# Patient Record
Sex: MALE | Race: WHITE | NOT HISPANIC OR LATINO | Employment: OTHER | URBAN - METROPOLITAN AREA
[De-identification: names, ages, dates, MRNs, and addresses within clinical notes are randomized per-mention and may not be internally consistent; named-entity substitution may affect disease eponyms.]

---

## 2017-10-24 ENCOUNTER — RECORDS - HEALTHEAST (OUTPATIENT)
Dept: LAB | Facility: CLINIC | Age: 72
End: 2017-10-24

## 2017-10-24 LAB
CHOLEST SERPL-MCNC: 172 MG/DL
FASTING STATUS PATIENT QL REPORTED: ABNORMAL
HDLC SERPL-MCNC: 30 MG/DL
LDLC SERPL CALC-MCNC: 111 MG/DL
PSA SERPL-MCNC: 4 NG/ML (ref 0–6.5)
TRIGL SERPL-MCNC: 154 MG/DL

## 2018-06-13 ENCOUNTER — RECORDS - HEALTHEAST (OUTPATIENT)
Dept: LAB | Facility: CLINIC | Age: 73
End: 2018-06-13

## 2018-06-14 LAB
CHOLEST SERPL-MCNC: 188 MG/DL
FASTING STATUS PATIENT QL REPORTED: NO
HDLC SERPL-MCNC: 29 MG/DL
LDLC SERPL CALC-MCNC: 105 MG/DL
TRIGL SERPL-MCNC: 270 MG/DL
URATE SERPL-MCNC: 7.4 MG/DL (ref 3–8)

## 2019-04-03 ENCOUNTER — RECORDS - HEALTHEAST (OUTPATIENT)
Dept: LAB | Facility: CLINIC | Age: 74
End: 2019-04-03

## 2019-04-03 LAB
ALBUMIN SERPL-MCNC: 3.8 G/DL (ref 3.5–5)
ALP SERPL-CCNC: 76 U/L (ref 45–120)
ALT SERPL W P-5'-P-CCNC: 37 U/L (ref 0–45)
ANION GAP SERPL CALCULATED.3IONS-SCNC: 12 MMOL/L (ref 5–18)
AST SERPL W P-5'-P-CCNC: 26 U/L (ref 0–40)
BASOPHILS # BLD AUTO: 0 THOU/UL (ref 0–0.2)
BASOPHILS NFR BLD AUTO: 1 % (ref 0–2)
BILIRUB SERPL-MCNC: 0.6 MG/DL (ref 0–1)
BUN SERPL-MCNC: 18 MG/DL (ref 8–28)
CALCIUM SERPL-MCNC: 9.8 MG/DL (ref 8.5–10.5)
CHLORIDE BLD-SCNC: 108 MMOL/L (ref 98–107)
CHOLEST SERPL-MCNC: 187 MG/DL
CO2 SERPL-SCNC: 23 MMOL/L (ref 22–31)
CREAT SERPL-MCNC: 1.19 MG/DL (ref 0.7–1.3)
EOSINOPHIL # BLD AUTO: 0.2 THOU/UL (ref 0–0.4)
EOSINOPHIL NFR BLD AUTO: 3 % (ref 0–6)
ERYTHROCYTE [DISTWIDTH] IN BLOOD BY AUTOMATED COUNT: 14.4 % (ref 11–14.5)
FASTING STATUS PATIENT QL REPORTED: ABNORMAL
GFR SERPL CREATININE-BSD FRML MDRD: 60 ML/MIN/1.73M2
GLUCOSE BLD-MCNC: 109 MG/DL (ref 70–125)
HCT VFR BLD AUTO: 43.3 % (ref 40–54)
HDLC SERPL-MCNC: 31 MG/DL
HGB BLD-MCNC: 13.8 G/DL (ref 14–18)
LDLC SERPL CALC-MCNC: 100 MG/DL
LYMPHOCYTES # BLD AUTO: 1.6 THOU/UL (ref 0.8–4.4)
LYMPHOCYTES NFR BLD AUTO: 22 % (ref 20–40)
MCH RBC QN AUTO: 29.6 PG (ref 27–34)
MCHC RBC AUTO-ENTMCNC: 31.9 G/DL (ref 32–36)
MCV RBC AUTO: 93 FL (ref 80–100)
MONOCYTES # BLD AUTO: 0.8 THOU/UL (ref 0–0.9)
MONOCYTES NFR BLD AUTO: 11 % (ref 2–10)
NEUTROPHILS # BLD AUTO: 4.5 THOU/UL (ref 2–7.7)
NEUTROPHILS NFR BLD AUTO: 63 % (ref 50–70)
PLATELET # BLD AUTO: 233 THOU/UL (ref 140–440)
PMV BLD AUTO: 10.9 FL (ref 8.5–12.5)
POTASSIUM BLD-SCNC: 3.9 MMOL/L (ref 3.5–5)
PROT SERPL-MCNC: 6.3 G/DL (ref 6–8)
RBC # BLD AUTO: 4.66 MILL/UL (ref 4.4–6.2)
SODIUM SERPL-SCNC: 143 MMOL/L (ref 136–145)
TRIGL SERPL-MCNC: 282 MG/DL
URATE SERPL-MCNC: 5.9 MG/DL (ref 3–8)
WBC: 7.2 THOU/UL (ref 4–11)

## 2019-12-02 ENCOUNTER — AMBULATORY - HEALTHEAST (OUTPATIENT)
Dept: OTHER | Facility: CLINIC | Age: 74
End: 2019-12-02

## 2020-06-30 ENCOUNTER — RECORDS - HEALTHEAST (OUTPATIENT)
Dept: LAB | Facility: CLINIC | Age: 75
End: 2020-06-30

## 2020-06-30 LAB
ALBUMIN SERPL-MCNC: 3.8 G/DL (ref 3.5–5)
ALP SERPL-CCNC: 86 U/L (ref 45–120)
ALT SERPL W P-5'-P-CCNC: 21 U/L (ref 0–45)
ANION GAP SERPL CALCULATED.3IONS-SCNC: 10 MMOL/L (ref 5–18)
AST SERPL W P-5'-P-CCNC: 17 U/L (ref 0–40)
BASOPHILS # BLD AUTO: 0 THOU/UL (ref 0–0.2)
BASOPHILS NFR BLD AUTO: 0 % (ref 0–2)
BILIRUB SERPL-MCNC: 0.5 MG/DL (ref 0–1)
BUN SERPL-MCNC: 21 MG/DL (ref 8–28)
CALCIUM SERPL-MCNC: 9.1 MG/DL (ref 8.5–10.5)
CHLORIDE BLD-SCNC: 109 MMOL/L (ref 98–107)
CHOLEST SERPL-MCNC: 175 MG/DL
CO2 SERPL-SCNC: 22 MMOL/L (ref 22–31)
CREAT SERPL-MCNC: 1.13 MG/DL (ref 0.7–1.3)
EOSINOPHIL # BLD AUTO: 0.3 THOU/UL (ref 0–0.4)
EOSINOPHIL NFR BLD AUTO: 6 % (ref 0–6)
ERYTHROCYTE [DISTWIDTH] IN BLOOD BY AUTOMATED COUNT: 14 % (ref 11–14.5)
FASTING STATUS PATIENT QL REPORTED: ABNORMAL
GFR SERPL CREATININE-BSD FRML MDRD: >60 ML/MIN/1.73M2
GLUCOSE BLD-MCNC: 87 MG/DL (ref 70–125)
HCT VFR BLD AUTO: 38.1 % (ref 40–54)
HDLC SERPL-MCNC: 32 MG/DL
HGB BLD-MCNC: 11.5 G/DL (ref 14–18)
LDLC SERPL CALC-MCNC: 95 MG/DL
LYMPHOCYTES # BLD AUTO: 1.5 THOU/UL (ref 0.8–4.4)
LYMPHOCYTES NFR BLD AUTO: 24 % (ref 20–40)
MCH RBC QN AUTO: 26.2 PG (ref 27–34)
MCHC RBC AUTO-ENTMCNC: 30.2 G/DL (ref 32–36)
MCV RBC AUTO: 87 FL (ref 80–100)
MONOCYTES # BLD AUTO: 0.6 THOU/UL (ref 0–0.9)
MONOCYTES NFR BLD AUTO: 10 % (ref 2–10)
NEUTROPHILS # BLD AUTO: 3.5 THOU/UL (ref 2–7.7)
NEUTROPHILS NFR BLD AUTO: 59 % (ref 50–70)
PLATELET # BLD AUTO: 264 THOU/UL (ref 140–440)
PMV BLD AUTO: 10.8 FL (ref 8.5–12.5)
POTASSIUM BLD-SCNC: 4.1 MMOL/L (ref 3.5–5)
PROT SERPL-MCNC: 6.2 G/DL (ref 6–8)
RBC # BLD AUTO: 4.39 MILL/UL (ref 4.4–6.2)
SODIUM SERPL-SCNC: 141 MMOL/L (ref 136–145)
TRIGL SERPL-MCNC: 242 MG/DL
URATE SERPL-MCNC: 5.9 MG/DL (ref 3–8)
WBC: 6 THOU/UL (ref 4–11)

## 2021-02-26 ENCOUNTER — AMBULATORY - HEALTHEAST (OUTPATIENT)
Dept: NURSING | Facility: CLINIC | Age: 76
End: 2021-02-26

## 2021-03-19 ENCOUNTER — AMBULATORY - HEALTHEAST (OUTPATIENT)
Dept: NURSING | Facility: CLINIC | Age: 76
End: 2021-03-19

## 2021-04-04 ENCOUNTER — COMMUNICATION - HEALTHEAST (OUTPATIENT)
Dept: SCHEDULING | Facility: CLINIC | Age: 76
End: 2021-04-04

## 2021-04-05 ENCOUNTER — COMMUNICATION - HEALTHEAST (OUTPATIENT)
Dept: SCHEDULING | Facility: CLINIC | Age: 76
End: 2021-04-05

## 2021-04-05 ENCOUNTER — AMBULATORY - HEALTHEAST (OUTPATIENT)
Dept: CARDIOLOGY | Facility: CLINIC | Age: 76
End: 2021-04-05

## 2021-04-05 DIAGNOSIS — E78.5 HYPERLIPIDEMIA: ICD-10-CM

## 2021-04-05 DIAGNOSIS — R06.09 EXERTIONAL DYSPNEA: ICD-10-CM

## 2021-04-08 ENCOUNTER — HOSPITAL ENCOUNTER (OUTPATIENT)
Dept: NUCLEAR MEDICINE | Facility: HOSPITAL | Age: 76
Discharge: HOME | End: 2021-04-08
Attending: INTERNAL MEDICINE

## 2021-04-08 ENCOUNTER — HOSPITAL ENCOUNTER (OUTPATIENT)
Dept: CARDIOLOGY | Facility: HOSPITAL | Age: 76
Discharge: HOME | End: 2021-04-08
Attending: INTERNAL MEDICINE

## 2021-04-08 DIAGNOSIS — E78.5 HYPERLIPIDEMIA: ICD-10-CM

## 2021-04-08 DIAGNOSIS — R06.09 EXERTIONAL DYSPNEA: ICD-10-CM

## 2021-04-08 LAB
CV STRESS CURRENT BP HE: NORMAL
CV STRESS CURRENT HR HE: 101
CV STRESS CURRENT HR HE: 107
CV STRESS CURRENT HR HE: 109
CV STRESS CURRENT HR HE: 113
CV STRESS CURRENT HR HE: 123
CV STRESS CURRENT HR HE: 126
CV STRESS CURRENT HR HE: 83
CV STRESS CURRENT HR HE: 90
CV STRESS CURRENT HR HE: 91
CV STRESS CURRENT HR HE: 94
CV STRESS CURRENT HR HE: 96
CV STRESS CURRENT HR HE: 99
CV STRESS CURRENT HR HE: 99
CV STRESS DEVIATION TIME HE: NORMAL
CV STRESS ECHO PERCENT HR HE: NORMAL
CV STRESS EXERCISE STAGE HE: NORMAL
CV STRESS FINAL RESTING BP HE: NORMAL
CV STRESS FINAL RESTING HR HE: 94
CV STRESS MAX HR HE: 126
CV STRESS MAX TREADMILL GRADE HE: 10
CV STRESS MAX TREADMILL SPEED HE: 1.7
CV STRESS PEAK DIA BP HE: NORMAL
CV STRESS PEAK SYS BP HE: NORMAL
CV STRESS PHASE HE: NORMAL
CV STRESS PROTOCOL HE: NORMAL
CV STRESS RESTING PT POSITION HE: NORMAL
CV STRESS RESTING PT POSITION HE: NORMAL
CV STRESS ST DEVIATION AMOUNT HE: NORMAL
CV STRESS ST DEVIATION ELEVATION HE: NORMAL
CV STRESS ST EVELATION AMOUNT HE: NORMAL
CV STRESS TEST TYPE HE: NORMAL
CV STRESS TOTAL STAGE TIME MIN 1 HE: NORMAL
NUC STRESS EJECTION FRACTION: 70 %
RATE PRESSURE PRODUCT: NORMAL
STRESS ECHO BASELINE DIASTOLIC HE: 79
STRESS ECHO BASELINE HR: 84
STRESS ECHO BASELINE SYSTOLIC BP: 124
STRESS ECHO CALCULATED PERCENT HR: 88 %
STRESS ECHO LAST STRESS DIASTOLIC BP: 74
STRESS ECHO LAST STRESS HR: 126
STRESS ECHO LAST STRESS SYSTOLIC BP: 132
STRESS ECHO POST ESTIMATED WORKLOAD: 4.7
STRESS ECHO POST EXERCISE DUR MIN: 3
STRESS ECHO POST EXERCISE DUR SEC: 0
STRESS ECHO TARGET HR: 144

## 2021-04-14 ENCOUNTER — RECORDS - HEALTHEAST (OUTPATIENT)
Dept: ADMINISTRATIVE | Facility: OTHER | Age: 76
End: 2021-04-14

## 2021-04-14 ENCOUNTER — AMBULATORY - HEALTHEAST (OUTPATIENT)
Dept: CARDIOLOGY | Facility: CLINIC | Age: 76
End: 2021-04-14

## 2021-04-16 ENCOUNTER — OFFICE VISIT - HEALTHEAST (OUTPATIENT)
Dept: CARDIOLOGY | Facility: CLINIC | Age: 76
End: 2021-04-16

## 2021-04-16 DIAGNOSIS — I10 ESSENTIAL HYPERTENSION: ICD-10-CM

## 2021-04-16 ASSESSMENT — MIFFLIN-ST. JEOR: SCORE: 1784.06

## 2021-05-24 ENCOUNTER — RECORDS - HEALTHEAST (OUTPATIENT)
Dept: ADMINISTRATIVE | Facility: CLINIC | Age: 76
End: 2021-05-24

## 2021-05-25 ENCOUNTER — RECORDS - HEALTHEAST (OUTPATIENT)
Dept: ADMINISTRATIVE | Facility: CLINIC | Age: 76
End: 2021-05-25

## 2021-05-27 ENCOUNTER — RECORDS - HEALTHEAST (OUTPATIENT)
Dept: ADMINISTRATIVE | Facility: CLINIC | Age: 76
End: 2021-05-27

## 2021-06-05 VITALS
HEART RATE: 88 BPM | RESPIRATION RATE: 20 BRPM | WEIGHT: 231 LBS | SYSTOLIC BLOOD PRESSURE: 140 MMHG | DIASTOLIC BLOOD PRESSURE: 74 MMHG | BODY MASS INDEX: 33.07 KG/M2 | HEIGHT: 70 IN

## 2021-06-16 PROBLEM — I87.2 PERIPHERAL VENOUS INSUFFICIENCY: Status: ACTIVE | Noted: 2021-04-03

## 2021-06-16 PROBLEM — R06.09 EXERTIONAL DYSPNEA: Status: ACTIVE | Noted: 2021-04-03

## 2021-06-16 PROBLEM — Z86.711 HISTORY OF PULMONARY EMBOLISM: Status: ACTIVE | Noted: 2021-04-03

## 2021-06-16 PROBLEM — D50.8 IRON DEFICIENCY ANEMIA DUE TO DIETARY CAUSES: Status: ACTIVE | Noted: 2021-04-03

## 2021-06-16 PROBLEM — I50.32 CHRONIC DIASTOLIC HEART FAILURE (H): Status: ACTIVE | Noted: 2021-04-03

## 2021-06-16 PROBLEM — I26.99 PULMONARY EMBOLISM WITHOUT ACUTE COR PULMONALE, UNSPECIFIED CHRONICITY, UNSPECIFIED PULMONARY EMBOLISM TYPE (H): Status: ACTIVE | Noted: 2019-12-02

## 2021-06-16 PROBLEM — E66.9 OBESITY: Status: ACTIVE | Noted: 2021-04-03

## 2021-06-16 PROBLEM — F41.9 ANXIETY: Status: ACTIVE | Noted: 2021-04-03

## 2021-06-19 NOTE — LETTER
Letter by Yoselin Woods RN at      Author: Yoselin Woods RN Service: -- Author Type: --    Filed:  Encounter Date: 12/2/2019 Status: Signed       [facility]  [facility]  [address] [place] [id number]    12/2/2019      Justyn lPata  [address] [facility] [address]    Dear Mr. Plata,    We were recently notified that you requested removal of a person from your emergency contact list. This person is named as a health care agent in your health care directive dated 7/18/11. Because this is a legal document, we cannot remove this person as a contact.    In order to remove someone named as a health care agent you will need to either provide us with a more recently dated health care directive or create a new one.   We have several options to assist you in creating a new document:   Health Care Directives and Advance Care Planning resources can be viewed and printed for free at our website: [email or web address]    Free group classes on Advance Care Planning and completing a Health Care Directive are available at multiple locations and times. These classes are led by trained staff who will provide information and guide you through a Health Care Directive. They can also review, notarize and add your Health Care Directive to your medical record. [place] for a class at [email or web address] or by calling [employer] at   [phone] or toll free [phone].    COPIES of completed Health Care Directives can be brought or mailed to any of our locations, including the address listed below. You can also email a copy to [email or web address]       Sincerely,    [facility] Landrum [facility]  [facility]  [address] [place] [id number]  Email us: [email or web address] Call us: [phone]  Visit at: [email or web address]

## 2021-06-27 ENCOUNTER — HEALTH MAINTENANCE LETTER (OUTPATIENT)
Age: 76
End: 2021-06-27

## 2021-06-30 NOTE — PROGRESS NOTES
Progress Notes by Shantell Calderón MD at 4/16/2021  9:50 AM     Author: Shantell Calderón MD Service: -- Author Type: Physician    Filed: 4/16/2021 10:25 AM Encounter Date: 4/16/2021 Status: Signed    : Shantell Calderón MD (Physician)           Thank you, Dr. Felipe, for asking us to see Justyn Plata at the [facility].      Assessment/Recommendations   Assessment:    1.  Dyslipidemia: Reviewed lipids from June 2020.  LDL is fairly well controlled with low HDL and elevated triglycerides  2.  Hypertension: Mildly elevated today however was better controlled when he recently saw his primary care provider  3.  Obesity  4.  Mild aortic root dilatation   5.  History of DVT/PE  6.  Large hiatal hernia    Plan:  1.  Continue aspirin.  Discussed niacin and TriCor as options regarding better lipid management.  He prefers to continue with lifestyle changes at this time.  Recommend improved exercise, diet and weight loss  2.  Suggested sleep study evaluation for JEFFREY due to daytime fatigue, right ventricular enlargement on echocardiogram.  He declines at this time   follow-up as needed       History of Present Illness    Mr. Justyn Plata is a 75 y.o. male with history of DVT/PE, dyslipidemia, obesity and gout who was admitted to the hospital couple weeks ago with shortness of breath.  Denies any chest pain.  He had a significant episode of shortness of breath that occurred when he was moving equipment.  Troponin and BNP were unremarkable.  Echocardiogram showed mild aortic root dilatation with preserved left ventricular systolic function, mild RV enlargement.  He underwent exercise nuclear stress testing which showed poor functional capacity and otherwise negative for inducible ischemia.    Exercise nuclear stress test 4/8/2021     There is no prior study for comparison.  ?  The patient's exercise capacity is moderately impaired.  No exercise induced angina, but  stopped exercise due to dyspnea.  ?  Exercise nuclear stress test is negative for inducible myocardial ischemia or infarction.  ?  Normal left ventricular size, wall motion and systolic function.  The calculated left ventricular ejection fraction is 70%.  ?  The patient is at a low risk of future cardiac ischemic events.     Comments: The patient only able to do exercise 3 minutes based on Bright protocol.  The patient has exaggerated ventricular rate and blunt blood pressure.     Physical Examination Review of Systems   Vitals:    04/16/21 0947   BP: 140/74   Pulse: 88   Resp: 20     Body mass index is 33.15 kg/m .  Wt Readings from Last 3 Encounters:   04/16/21 (!) 231 lb (104.8 kg)   04/04/21 (!) 226 lb 1.6 oz (102.6 kg)   12/20/20 220 lb (99.8 kg)       General Appearance:   alert, no apparent distress   HEENT:  no scleral icterus; the mucous membranes are pink and moist                                  Neck: No jvd   Chest: the spine is straight and the chest is symmetric   Lungs:   respirations unlabored; the lungs are clear to auscultation   Cardiovascular:   regular rhythm        Extremities: no edema   Skin: no xanthelasma    General: WNL  Eyes: WNL  Ears/Nose/Throat: WNL  Lungs: Shortness of Breath  Heart: WNL  Stomach: WNL  Bladder: WNL  Muscle/Joints: WNL  Skin: WNL  Nervous System: WNL  Mental Health: WNL     Blood: WNL     Medical History  Surgical History Family History Social History   Past Medical History:   Diagnosis Date   ? Anxiety 04/03/2021   ? Bladder wall thickening    ? Cataracts, bilateral    ? Chronic diastolic heart failure (H) 04/03/2021   ? Closed fracture of multiple ribs 10/2020   ? DVT of leg (deep venous thrombosis) (H)    ? Enlarged prostate    ? GERD (gastroesophageal reflux disease)    ? Gout    ? 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 Hyperlipidemia    ? Migraines    ? Nephrolithiasis    ? Onychomycosis    ? Peripheral venous insufficiency 04/03/2021    Past  Surgical History:   Procedure Laterality Date   ? EPIDIDYMECTOMY     ? HERNIA REPAIR      umbilical   ? TOE SURGERY Left     great toe-cyst    Family History   Problem Relation Age of Onset   ? Colon cancer Father    ? Cancer Brother         leiomyosarcoma   ? Diabetes type II Brother     Social History     Socioeconomic History   ? Marital status: Single     Spouse name: Not on file   ? Number of children: Not on file   ? Years of education: Not on file   ? Highest education level: Not on file   Occupational History   ? Not on file   Social Needs   ? Financial resource strain: Not on file   ? Food insecurity     Worry: Not on file     Inability: Not on file   ? Transportation needs     Medical: Not on file     Non-medical: Not on file   Tobacco Use   ? Smoking status: Former Smoker     Packs/day: 2.00     Years: 4.00     Pack years: 8.00     Types: Cigarettes     Quit date: 1964     Years since quittin.3   ? Smokeless tobacco: Never Used   ? Tobacco comment: was up to 3 PPD at end   Substance and Sexual Activity   ? Alcohol use: Yes     Comment: occasional   ? Drug use: No   ? Sexual activity: Not on file   Lifestyle   ? Physical activity     Days per week: Not on file     Minutes per session: Not on file   ? Stress: Not on file   Relationships   ? Social connections     Talks on phone: Not on file     Gets together: Not on file     Attends [employer] service: Not on file     Active member of club or organization: Not on file     Attends meetings of clubs or organizations: Not on file     Relationship status: Not on file   ? Intimate partner violence     Fear of current or ex partner: Not on file     Emotionally abused: Not on file     Physically abused: Not on file     Forced sexual activity: Not on file   Other Topics Concern   ? Not on file   Social History Narrative   ? Not on file          Medications  Allergies   Current Outpatient Medications   Medication Sig Dispense Refill   ? allopurinoL  (ZYLOPRIM) 300 MG tablet Take 300 mg by mouth 3 (three) times a week. Takes MWF in the evening     ? aspirin 81 MG EC tablet Take 81 mg by mouth 3 (three) times a week. Takes MWF in the evening     ? cetirizine (ZYRTEC) 10 MG tablet Take 10 mg by mouth daily as needed for allergies.     ? ibuprofen-diphenhydramine HCl (IBUPROFEN PM) 200-25 mg cap Take 1 tablet by mouth at bedtime as needed.       No current facility-administered medications for this visit.       Allergies   Allergen Reactions   ? Statins-Hmg-Coa Reductase Inhibitors Myalgia         Lab Results    Chemistry/lipid CBC Cardiac Enzymes/BNP/TSH/INR   Lab Results   Component Value Date    CHOL 175 06/30/2020    HDL 32 (L) 06/30/2020    LDLCALC 95 06/30/2020    TRIG 242 (H) 06/30/2020    CREATININE 1.06 04/04/2021    BUN 17 04/04/2021    K 4.3 04/04/2021     04/04/2021     (H) 04/04/2021    CO2 22 04/04/2021    Lab Results   Component Value Date    WBC 7.3 04/03/2021    HGB 9.6 (L) 04/04/2021    HCT 36.1 (L) 04/03/2021    MCV 76 (L) 04/03/2021     04/03/2021    Lab Results   Component Value Date    TROPONINI <0.01 04/04/2021    BNP <10 04/03/2021    TSH 2.88 05/15/2017    INR 0.99 04/03/2021

## 2021-08-27 ENCOUNTER — LAB REQUISITION (OUTPATIENT)
Dept: LAB | Facility: CLINIC | Age: 76
End: 2021-08-27

## 2021-08-27 DIAGNOSIS — D50.8 OTHER IRON DEFICIENCY ANEMIAS: ICD-10-CM

## 2021-08-27 DIAGNOSIS — M1A.0790 IDIOPATHIC CHRONIC GOUT, UNSPECIFIED ANKLE AND FOOT, WITHOUT TOPHUS (TOPHI): ICD-10-CM

## 2021-08-27 DIAGNOSIS — E78.2 MIXED HYPERLIPIDEMIA: ICD-10-CM

## 2021-08-27 LAB
ALBUMIN SERPL-MCNC: 3.5 G/DL (ref 3.5–5)
ALP SERPL-CCNC: 110 U/L (ref 45–120)
ALT SERPL W P-5'-P-CCNC: 11 U/L (ref 0–45)
ANION GAP SERPL CALCULATED.3IONS-SCNC: 10 MMOL/L (ref 5–18)
AST SERPL W P-5'-P-CCNC: 11 U/L (ref 0–40)
BILIRUB SERPL-MCNC: 0.4 MG/DL (ref 0–1)
BUN SERPL-MCNC: 20 MG/DL (ref 8–28)
CALCIUM SERPL-MCNC: 9.1 MG/DL (ref 8.5–10.5)
CHLORIDE BLD-SCNC: 110 MMOL/L (ref 98–107)
CHOLEST SERPL-MCNC: 134 MG/DL
CO2 SERPL-SCNC: 21 MMOL/L (ref 22–31)
CREAT SERPL-MCNC: 1.23 MG/DL (ref 0.7–1.3)
ERYTHROCYTE [DISTWIDTH] IN BLOOD BY AUTOMATED COUNT: 18 % (ref 10–15)
GFR SERPL CREATININE-BSD FRML MDRD: 56 ML/MIN/1.73M2
GLUCOSE BLD-MCNC: 106 MG/DL (ref 70–125)
HCT VFR BLD AUTO: 28.7 % (ref 40–53)
HDLC SERPL-MCNC: 28 MG/DL
HGB BLD-MCNC: 8 G/DL (ref 13.3–17.7)
LDLC SERPL CALC-MCNC: 83 MG/DL
MCH RBC QN AUTO: 20.4 PG (ref 26.5–33)
MCHC RBC AUTO-ENTMCNC: 27.9 G/DL (ref 31.5–36.5)
MCV RBC AUTO: 73 FL (ref 78–100)
PLATELET # BLD AUTO: 339 10E3/UL (ref 150–450)
POTASSIUM BLD-SCNC: 4.8 MMOL/L (ref 3.5–5)
PROT SERPL-MCNC: 6 G/DL (ref 6–8)
RBC # BLD AUTO: 3.92 10E6/UL (ref 4.4–5.9)
SODIUM SERPL-SCNC: 141 MMOL/L (ref 136–145)
TRIGL SERPL-MCNC: 113 MG/DL
TSH SERPL DL<=0.005 MIU/L-ACNC: 2.32 UIU/ML (ref 0.3–5)
URATE SERPL-MCNC: 5.5 MG/DL (ref 3–8)
WBC # BLD AUTO: 7.5 10E3/UL (ref 4–11)

## 2021-08-27 PROCEDURE — 80053 COMPREHEN METABOLIC PANEL: CPT | Performed by: FAMILY MEDICINE

## 2021-08-27 PROCEDURE — 84443 ASSAY THYROID STIM HORMONE: CPT | Performed by: FAMILY MEDICINE

## 2021-08-27 PROCEDURE — 80061 LIPID PANEL: CPT | Performed by: FAMILY MEDICINE

## 2021-08-27 PROCEDURE — 85027 COMPLETE CBC AUTOMATED: CPT | Performed by: FAMILY MEDICINE

## 2021-08-27 PROCEDURE — 84550 ASSAY OF BLOOD/URIC ACID: CPT | Performed by: FAMILY MEDICINE

## 2021-10-17 ENCOUNTER — HEALTH MAINTENANCE LETTER (OUTPATIENT)
Age: 76
End: 2021-10-17

## 2021-11-05 ENCOUNTER — APPOINTMENT (OUTPATIENT)
Dept: CT IMAGING | Facility: HOSPITAL | Age: 76
End: 2021-11-05
Attending: EMERGENCY MEDICINE
Payer: COMMERCIAL

## 2021-11-05 ENCOUNTER — HOSPITAL ENCOUNTER (EMERGENCY)
Facility: HOSPITAL | Age: 76
Discharge: HOME | End: 2021-11-05
Attending: EMERGENCY MEDICINE | Admitting: EMERGENCY MEDICINE
Payer: COMMERCIAL

## 2021-11-05 VITALS
OXYGEN SATURATION: 95 % | DIASTOLIC BLOOD PRESSURE: 82 MMHG | HEART RATE: 100 BPM | SYSTOLIC BLOOD PRESSURE: 142 MMHG | WEIGHT: 225 LBS | TEMPERATURE: 97.9 F | BODY MASS INDEX: 32.28 KG/M2

## 2021-11-05 DIAGNOSIS — R06.02 CHRONIC SHORTNESS OF BREATH: ICD-10-CM

## 2021-11-05 DIAGNOSIS — R10.84 ABDOMINAL PAIN, GENERALIZED: ICD-10-CM

## 2021-11-05 LAB
ALBUMIN SERPL-MCNC: 4.1 G/DL (ref 3.5–5)
ALBUMIN UR-MCNC: 10 MG/DL
ALP SERPL-CCNC: 109 U/L (ref 45–120)
ALT SERPL W P-5'-P-CCNC: 19 U/L (ref 0–45)
ANION GAP SERPL CALCULATED.3IONS-SCNC: 8 MMOL/L (ref 5–18)
APPEARANCE UR: CLEAR
APTT PPP: 26 SECONDS (ref 22–38)
AST SERPL W P-5'-P-CCNC: 15 U/L (ref 0–40)
BILIRUB DIRECT SERPL-MCNC: 0.2 MG/DL
BILIRUB SERPL-MCNC: 0.7 MG/DL (ref 0–1)
BILIRUB UR QL STRIP: NEGATIVE
BUN SERPL-MCNC: 20 MG/DL (ref 8–28)
CALCIUM SERPL-MCNC: 9.9 MG/DL (ref 8.5–10.5)
CHLORIDE BLD-SCNC: 107 MMOL/L (ref 98–107)
CO2 SERPL-SCNC: 25 MMOL/L (ref 22–31)
COLOR UR AUTO: ABNORMAL
CREAT SERPL-MCNC: 1.21 MG/DL (ref 0.7–1.3)
D DIMER PPP FEU-MCNC: 1.37 UG/ML FEU (ref 0–0.5)
ERYTHROCYTE [DISTWIDTH] IN BLOOD BY AUTOMATED COUNT: 18.6 % (ref 10–15)
GFR SERPL CREATININE-BSD FRML MDRD: 57 ML/MIN/1.73M2
GLUCOSE BLD-MCNC: 124 MG/DL (ref 70–125)
GLUCOSE UR STRIP-MCNC: NEGATIVE MG/DL
HCT VFR BLD AUTO: 33.1 % (ref 40–53)
HGB BLD-MCNC: 9.1 G/DL (ref 13.3–17.7)
HGB UR QL STRIP: NEGATIVE
HOLD SPECIMEN: NORMAL
INR PPP: 1.06 (ref 0.9–1.15)
KETONES UR STRIP-MCNC: NEGATIVE MG/DL
LEUKOCYTE ESTERASE UR QL STRIP: NEGATIVE
LIPASE SERPL-CCNC: 48 U/L (ref 0–52)
MCH RBC QN AUTO: 18.7 PG (ref 26.5–33)
MCHC RBC AUTO-ENTMCNC: 27.5 G/DL (ref 31.5–36.5)
MCV RBC AUTO: 68 FL (ref 78–100)
MUCOUS THREADS #/AREA URNS LPF: PRESENT /LPF
NITRATE UR QL: NEGATIVE
PH UR STRIP: 5.5 [PH] (ref 5–7)
PLATELET # BLD AUTO: 357 10E3/UL (ref 150–450)
POTASSIUM BLD-SCNC: 4.1 MMOL/L (ref 3.5–5)
PROT SERPL-MCNC: 7.5 G/DL (ref 6–8)
RBC # BLD AUTO: 4.87 10E6/UL (ref 4.4–5.9)
RBC URINE: 0 /HPF
SODIUM SERPL-SCNC: 140 MMOL/L (ref 136–145)
SP GR UR STRIP: >1.05 (ref 1–1.03)
TROPONIN I SERPL-MCNC: <0.01 NG/ML (ref 0–0.29)
UROBILINOGEN UR STRIP-MCNC: <2 MG/DL
WBC # BLD AUTO: 13.3 10E3/UL (ref 4–11)
WBC URINE: 1 /HPF

## 2021-11-05 PROCEDURE — 96375 TX/PRO/DX INJ NEW DRUG ADDON: CPT | Mod: 59

## 2021-11-05 PROCEDURE — 85014 HEMATOCRIT: CPT | Performed by: EMERGENCY MEDICINE

## 2021-11-05 PROCEDURE — 96374 THER/PROPH/DIAG INJ IV PUSH: CPT | Mod: 59

## 2021-11-05 PROCEDURE — 93005 ELECTROCARDIOGRAM TRACING: CPT | Performed by: EMERGENCY MEDICINE

## 2021-11-05 PROCEDURE — 81001 URINALYSIS AUTO W/SCOPE: CPT | Performed by: EMERGENCY MEDICINE

## 2021-11-05 PROCEDURE — 258N000003 HC RX IP 258 OP 636: Performed by: EMERGENCY MEDICINE

## 2021-11-05 PROCEDURE — 83690 ASSAY OF LIPASE: CPT | Performed by: EMERGENCY MEDICINE

## 2021-11-05 PROCEDURE — 80053 COMPREHEN METABOLIC PANEL: CPT | Performed by: EMERGENCY MEDICINE

## 2021-11-05 PROCEDURE — 71275 CT ANGIOGRAPHY CHEST: CPT

## 2021-11-05 PROCEDURE — 250N000011 HC RX IP 250 OP 636: Performed by: EMERGENCY MEDICINE

## 2021-11-05 PROCEDURE — 36415 COLL VENOUS BLD VENIPUNCTURE: CPT | Performed by: EMERGENCY MEDICINE

## 2021-11-05 PROCEDURE — 96361 HYDRATE IV INFUSION ADD-ON: CPT

## 2021-11-05 PROCEDURE — 99285 EMERGENCY DEPT VISIT HI MDM: CPT | Mod: 25

## 2021-11-05 PROCEDURE — 84484 ASSAY OF TROPONIN QUANT: CPT | Performed by: EMERGENCY MEDICINE

## 2021-11-05 PROCEDURE — 85730 THROMBOPLASTIN TIME PARTIAL: CPT | Performed by: EMERGENCY MEDICINE

## 2021-11-05 PROCEDURE — 85379 FIBRIN DEGRADATION QUANT: CPT | Performed by: EMERGENCY MEDICINE

## 2021-11-05 PROCEDURE — 74174 CTA ABD&PLVS W/CONTRAST: CPT

## 2021-11-05 PROCEDURE — 85610 PROTHROMBIN TIME: CPT | Performed by: EMERGENCY MEDICINE

## 2021-11-05 RX ORDER — KETOROLAC TROMETHAMINE 15 MG/ML
15 INJECTION, SOLUTION INTRAMUSCULAR; INTRAVENOUS ONCE
Status: COMPLETED | OUTPATIENT
Start: 2021-11-05 | End: 2021-11-05

## 2021-11-05 RX ORDER — IOPAMIDOL 755 MG/ML
75 INJECTION, SOLUTION INTRAVASCULAR ONCE
Status: COMPLETED | OUTPATIENT
Start: 2021-11-05 | End: 2021-11-05

## 2021-11-05 RX ORDER — ONDANSETRON 2 MG/ML
4 INJECTION INTRAMUSCULAR; INTRAVENOUS ONCE
Status: COMPLETED | OUTPATIENT
Start: 2021-11-05 | End: 2021-11-05

## 2021-11-05 RX ORDER — MORPHINE SULFATE 4 MG/ML
4 INJECTION, SOLUTION INTRAMUSCULAR; INTRAVENOUS ONCE
Status: COMPLETED | OUTPATIENT
Start: 2021-11-05 | End: 2021-11-05

## 2021-11-05 RX ADMIN — SODIUM CHLORIDE 1000 ML: 9 INJECTION, SOLUTION INTRAVENOUS at 14:25

## 2021-11-05 RX ADMIN — KETOROLAC TROMETHAMINE 15 MG: 15 INJECTION, SOLUTION INTRAMUSCULAR; INTRAVENOUS at 15:16

## 2021-11-05 RX ADMIN — MORPHINE SULFATE 4 MG: 4 INJECTION INTRAVENOUS at 14:31

## 2021-11-05 RX ADMIN — IOPAMIDOL 75 ML: 755 INJECTION, SOLUTION INTRAVENOUS at 15:54

## 2021-11-05 RX ADMIN — ONDANSETRON 4 MG: 2 INJECTION INTRAMUSCULAR; INTRAVENOUS at 14:30

## 2021-11-05 NOTE — DISCHARGE INSTRUCTIONS
Please follow-up with your Primary Care Provider early next week for a recheck; call to arrange appointment.    Return to the ER for worsening symptoms, worsening abdominal pain, worsening shortness of breath, if you develop chest pain, persistent nausea / vomiting, fever or other concerns.    Use the incentive spirometer every hour while you are awake.

## 2021-11-05 NOTE — ED PROVIDER NOTES
[facility] EMERGENCY DEPARTMENT  PHYSICIAN-IN-TRIAGE NOTE    MRN: [id number]    Justyn Plata was seen in triage to expedite care while awaiting a room in the emergency department.    The patient is here with severe abdominal pain, focused more on the left side. The pain started very mildly yesterday, and started to increase at 0900 this morning (10/5). He states that he has a history of diverticulitis. Additionally, he endorses nausea and dizziness. His pain is currently at a 9/10. The exam is notable for left lower quadrant tenderness, no rebound or guarding.    Based on my brief assessment of the patient, I will order labs, CT, and symptomatic meds. The patient will be moved to an ED room as soon as one is available.      I attest that Kelly Jaime is acting in a scribe capacity, has observed my performance of services, and has documented them in accordance with my direction.         Jin Oh MD  11/05/21 [id number]

## 2021-11-05 NOTE — ED TRIAGE NOTES
Pt having LLQ pain spreading to the rest of his abdomen. Pt PCP has him set up for colonoscopy soon concerned for some internal bleeding as he is anemic. Pt appears very uncomfortable in triage. Nausea, dizziness.

## 2021-11-05 NOTE — ED PROVIDER NOTES
Emergency Department Encounter     Evaluation Date & Time:   2021  2:15 PM    CHIEF COMPLAINT:  Abdominal Pain      Triage Note:Pt having LLQ pain spreading to the rest of his abdomen. Pt PCP has him set up for colonoscopy soon concerned for some internal bleeding as he is anemic. Pt appears very uncomfortable in triage. Nausea, dizziness.         Impression and Plan       FINAL IMPRESSION:    ICD-10-CM    1. Chronic shortness of breath  R06.02     possible atelectasis   2. Abdominal pain, generalized  R10.84          ED COURSE & MEDICAL DECISION MAKIN yo M, history of diastolic CHF, previous DVT / PE (no longer anticoagulated), PVD, HLD, diverticulitis, gout, BPH and GERD, who presents for evaluation of sharp LLQ abdominal pain that has been progressively worsening since onset last night. The pain is now generalized and burning in nature.  He reports associated nausea with one episode of vomiting.  Denies diarrhea with a firmer than normal BM this morning. No urinary symptoms or fevers.     Also reports ongoing SOB x one year; denies associated chest pain and cough.  Has been fully vaccinated for COVID.    On exam, he has borderline tachycardic rate with regular rhythm.  Lungs are clear.  Abdomen is soft with moderate tenderness to palpation diffusely; no peritoneal signs.    Patient placed on cardiac monitor, IV access established and blood sent for labs.    EKG performed and demonstrated NSR with prolonged QTc (507ms), RBBB and no ST-T wave changes consistent with ACS or pericarditis. Troponin WNL (<0.01); a single, normal troponin is reassuring that his shortness of breath is not secondary to ACS given that it has been ongoing for several months.    PE considered.  D-dimer elevated (1.37) for which CTA chest was performed and demonstrated:  1.  No pulmonary artery embolism or thoracic aortic dissection.  2.  Mild BL dependent pulmonary opacities favored to reflect atelectasis rather than pneumonic  infiltrates. Findings suggesting pulmonary           air trapping which can be seen with non-specific bronchiolitis. No pleural effusion.    CT abdomen / pelvis also performed and demonstrated:  1.  No acute findings in the abdomen or pelvis. No evidence of active GI bleeding, inflammatory process or hydronephrosis.  2.  Colonic diverticulosis without convincing evidence of acute diverticulitis.  3.  Nonobstructing 1 to 2 mm left renal calculi.  4.  Stable enlarged prostate gland with nodular protrusion along the base of the urinary bladder. Serum PSA level may be helpful.    Labs otherwise remarkable for mild leukocytosis (WBC 13.3) with stable anemia (Hb 9.1).  He has no significant electrolyte derangements or renal impairment.  No laboratory evidence of hepatitis, biliary obstruction or pancreatitis.  UA negative for infection and hematuria.    Patient discharged home with follow-up PMD.  Return precautions provided.  Patient stable throughout ED course and in improved condition at time of disposition.        2:18 PM Met with patient for initial interview and exam. Discussed initial plan for care for their stay in the emergency department.  5:21 PM Updated patient. Discussed plans for discharge. Patient is in agreement.     PPE: Provider wore gloves, N95 mask, eye protection, surgical cap, and paper mask.    At the conclusion of the encounter I discussed the results of all the tests and the disposition. The questions were answered. The patient family acknowledged understanding and was agreeable with the care plan.      MEDICATIONS GIVEN IN THE EMERGENCY DEPARTMENT:  Medications   ondansetron (ZOFRAN) injection 4 mg (4 mg Intravenous Given 11/5/21 1430)   0.9% sodium chloride BOLUS (0 mLs Intravenous Stopped 11/5/21 1719)   morphine (PF) injection 4 mg (4 mg Intravenous Given 11/5/21 1431)   ketorolac (TORADOL) injection 15 mg (15 mg Intravenous Given 11/5/21 1516)   iopamidol (ISOVUE-370) solution 75 mL (75 mLs  Intravenous Given 11/5/21 1554)       NEW PRESCRIPTIONS STARTED AT TODAY'S ED VISIT:  Discharge Medication List as of 11/5/2021  5:44 PM          HPI     The history is provided by the patient.        Justyn Plata is a 76 year old male with a pertinent history of diastolic CHF, diverticulitis, GERD, PVD, BPH, recurrent DVT/PE (no longer anticoagulated), iron deficiency anemia, exertional dyspnea, obesity, gout, and hyperlipidemia who presents to this ED via walk-in for evaluation of abdominal pain. The pain began last night as a slight discomfort in his left lower abdomen. Patient was able to sleep comfortably but awoke with the discomfort present. Around 9 AM he began to experience progressive worsening of his pain. It is sharp in the left lower quadrant and radiates as a burning pain to the rest of his abdomen. The pain worsens with lying down and sitting and improves slightly when standing. Patient endorses the current pain is more severe than his previous episode of diverticulitis. He reports nausea with vomiting 1x, lightheadedness and chills with no associated fevers.  No diarrhea, constipation, hematochezia or melena. No urinary symptoms.     Patient also reports shortness of breath which has been ongoing for one year. Denies associated chest pain, cough. He is fully vaccinated against COVID-19.    No other concerns at this time.     REVIEW OF SYSTEMS:  All other systems reviewed and are negative.      Medical History     Past Medical History:   Diagnosis Date     Anxiety 04/03/2021     Bladder wall thickening      Cataracts, bilateral      Chronic diastolic heart failure (H) 04/03/2021     Closed fracture of multiple ribs 10/2020     DVT of leg (deep venous thrombosis) (H)      Enlarged prostate      GERD (gastroesophageal reflux disease)      Gout      History of pulmonary embolism 12/2019     Hyperlipidemia      Migraines      Nephrolithiasis      Onychomycosis      Peripheral venous insufficiency  2021       Past Surgical History:   Procedure Laterality Date     EPIDIDYMECTOMY       HERNIA REPAIR      umbilical     TOE SURGERY Left     great toe-cyst       Family History   Problem Relation Age of Onset     Colon Cancer Father      Cancer Brother         leiomyosarcoma     Diabetes Type 2  Brother        Social History     Tobacco Use     Smoking status: Former Smoker     Packs/day: 2.00     Years: 4.00     Pack years: 8.00     Types: Cigarettes     Quit date: 1964     Years since quittin.8     Smokeless tobacco: Never Used     Tobacco comment: was up to 3 PPD at end   Substance Use Topics     Alcohol use: Yes     Comment: Alcoholic Drinks/day: occasional     Drug use: No       allopurinoL (ZYLOPRIM) 300 MG tablet  aspirin 81 MG EC tablet  cetirizine (ZYRTEC) 10 MG tablet  ibuprofen-diphenhydramine HCl (IBUPROFEN PM) 200-25 mg cap        Physical Exam     First Vitals:  Patient Vitals for the past 24 hrs:   BP Temp Temp src Pulse Resp SpO2 Weight   21 1700 (!) 142/82 -- -- 100 (!) 0 95 % --   21 1630 (!) 152/87 -- -- 101 (!) 34 94 % --   21 1600 (!) 175/87 -- -- -- -- -- --   21 1530 (!) 166/75 -- -- 99 (!) 36 95 % --   21 1500 (!) 175/79 -- -- 100 12 97 % --   21 1444 (!) 156/88 -- -- 97 10 99 % --   21 1430 (!) 156/88 -- -- 105 -- 99 % --   21 1348 -- -- -- -- -- -- 102.1 kg (225 lb)   21 1346 (!) 144/79 97.9  F (36.6  C) Oral 104 22 95 % --       PHYSICAL EXAM:   Physical Exam    GENERAL: Awake, alert.  In mild acute distress.   HEENT: Normocephalic, atraumatic. Pupils equal, round and reactive. Conjunctiva normal.   NECK: No stridor.  PULMONARY: Symmetrical breath sounds without distress.  Lungs clear to auscultation bilaterally without wheezes, rhonchi or rales.  CARDIO: Borderline tachycardic rate with regular rhythm.  No significant murmur, rub or gallop.  Radial pulses strong and symmetrical.  ABDOMINAL: Abdomen soft,  non-distended with moderate tenderness to palpation diffusely; no rebound tenderness or guarding.  No CVAT, BL.  EXTREMITIES: No lower extremity swelling or edema.      NEURO: Alert and oriented to person, place and time.  Cranial nerves grossly intact.  No focal motor deficit.  PSYCH: Normal mood and affect.  SKIN: No rashes.     Results     LAB:  All pertinent labs reviewed and interpreted  Labs Ordered and Resulted from Time of ED Arrival to Time of ED Departure   CBC WITH PLATELETS - Abnormal       Result Value    WBC Count 13.3 (*)     RBC Count 4.87      Hemoglobin 9.1 (*)     Hematocrit 33.1 (*)     MCV 68 (*)     MCH 18.7 (*)     MCHC 27.5 (*)     RDW 18.6 (*)     Platelet Count 357     BASIC METABOLIC PANEL - Abnormal    Sodium 140      Potassium 4.1      Chloride 107      Carbon Dioxide (CO2) 25      Anion Gap 8      Urea Nitrogen 20      Creatinine 1.21      Calcium 9.9      Glucose 124      GFR Estimate 57 (*)    D DIMER QUANTITATIVE - Abnormal    D-Dimer Quantitative 1.37 (*)    HEPATIC FUNCTION PANEL - Normal    Bilirubin Total 0.7      Bilirubin Direct 0.2      Protein Total 7.5      Albumin 4.1      Alkaline Phosphatase 109      AST 15      ALT 19     LIPASE - Normal    Lipase 48     INR - Normal    INR 1.06     PARTIAL THROMBOPLASTIN TIME - Normal    aPTT 26     TROPONIN I - Normal    Troponin I <0.01         RADIOLOGY:  CTA Abdomen Pelvis with Contrast   Final Result   IMPRESSION:   1.  No acute findings in the abdomen or pelvis. No evidence of active GI bleeding, inflammatory process or hydronephrosis.   2.  Colonic diverticulosis without convincing evidence of acute diverticulitis.   3.  Nonobstructing 1 to 2 mm left renal calculi.   4.  Stable enlarged prostate gland with nodular protrusion along the base of the urinary bladder. Correlation with serum PSA level may be helpful.      CT Chest Pulmonary Embolism w Contrast   Final Result   IMPRESSION:   1.  No pulmonary artery embolism or thoracic  aortic dissection.   2.  Mild bilateral dependent pulmonary opacities favored to reflect atelectasis rather than pneumonic infiltrates. Findings suggesting pulmonary air trapping which can be seen with nonspecific bronchiolitis. No pleural effusion.          EC2021, 14:41; NSR with rate of 94 bpm; prolonged QTc (507ms); RBBB; no ST-T wave changes consistent with ACS or pericarditis; compared to previous EKG dated 4/3/2021, the QTc has lengthened, otherwise there are no significant changes    EKG independently reviewed and interpreted by Nicole Sanabria MD      I, Abigail Branch, am serving as a scribe to document services personally performed by Nicole Sanabria MD based on my observation and the provider's statements to me. I, Nicole Sanabria MD attest that Abigail Branch is acting in a scribe capacity, has observed my performance of the services and has documented them in accordance with my direction.    Nicole Sanabria MD  Emergency Medicine  [facility] EMERGENCY DEPARTMENT         Nicole Sanabria MD  21 [id number]

## 2021-11-06 LAB
ATRIAL RATE - MUSE: 94 BPM
DIASTOLIC BLOOD PRESSURE - MUSE: 88 MMHG
INTERPRETATION ECG - MUSE: NORMAL
P AXIS - MUSE: 35 DEGREES
PR INTERVAL - MUSE: 182 MS
QRS DURATION - MUSE: 138 MS
QT - MUSE: 406 MS
QTC - MUSE: 507 MS
R AXIS - MUSE: -9 DEGREES
SYSTOLIC BLOOD PRESSURE - MUSE: 156 MMHG
T AXIS - MUSE: -19 DEGREES
VENTRICULAR RATE- MUSE: 94 BPM

## 2022-01-11 ENCOUNTER — DOCUMENTATION ONLY (OUTPATIENT)
Dept: OTHER | Facility: CLINIC | Age: 77
End: 2022-01-11
Payer: COMMERCIAL

## 2022-01-26 ENCOUNTER — DOCUMENTATION ONLY (OUTPATIENT)
Dept: OTHER | Facility: CLINIC | Age: 77
End: 2022-01-26
Payer: COMMERCIAL

## 2022-02-10 ENCOUNTER — LAB REQUISITION (OUTPATIENT)
Dept: LAB | Facility: CLINIC | Age: 77
End: 2022-02-10

## 2022-02-10 DIAGNOSIS — M1A.0790 IDIOPATHIC CHRONIC GOUT, UNSPECIFIED ANKLE AND FOOT, WITHOUT TOPHUS (TOPHI): ICD-10-CM

## 2022-02-10 LAB
ANION GAP SERPL CALCULATED.3IONS-SCNC: 10 MMOL/L (ref 5–18)
BUN SERPL-MCNC: 19 MG/DL (ref 8–28)
CALCIUM SERPL-MCNC: 9.4 MG/DL (ref 8.5–10.5)
CHLORIDE BLD-SCNC: 109 MMOL/L (ref 98–107)
CO2 SERPL-SCNC: 23 MMOL/L (ref 22–31)
CREAT SERPL-MCNC: 1.19 MG/DL (ref 0.7–1.3)
GFR SERPL CREATININE-BSD FRML MDRD: 63 ML/MIN/1.73M2
GLUCOSE BLD-MCNC: 111 MG/DL (ref 70–125)
POTASSIUM BLD-SCNC: 4.3 MMOL/L (ref 3.5–5)
SODIUM SERPL-SCNC: 142 MMOL/L (ref 136–145)
URATE SERPL-MCNC: 7.4 MG/DL (ref 3–8)

## 2022-02-10 PROCEDURE — 84550 ASSAY OF BLOOD/URIC ACID: CPT | Performed by: NURSE PRACTITIONER

## 2022-02-10 PROCEDURE — 80048 BASIC METABOLIC PNL TOTAL CA: CPT | Performed by: NURSE PRACTITIONER

## 2022-07-24 ENCOUNTER — HEALTH MAINTENANCE LETTER (OUTPATIENT)
Age: 77
End: 2022-07-24

## 2022-08-12 ENCOUNTER — LAB REQUISITION (OUTPATIENT)
Dept: LAB | Facility: CLINIC | Age: 77
End: 2022-08-12

## 2022-08-12 DIAGNOSIS — M1A.0790 IDIOPATHIC CHRONIC GOUT, UNSPECIFIED ANKLE AND FOOT, WITHOUT TOPHUS (TOPHI): ICD-10-CM

## 2022-08-12 DIAGNOSIS — E78.2 MIXED HYPERLIPIDEMIA: ICD-10-CM

## 2022-08-12 DIAGNOSIS — I50.32 CHRONIC DIASTOLIC (CONGESTIVE) HEART FAILURE (H): ICD-10-CM

## 2022-08-12 LAB
ALBUMIN SERPL BCG-MCNC: 4.4 G/DL (ref 3.5–5.2)
ALP SERPL-CCNC: 81 U/L (ref 40–129)
ALT SERPL W P-5'-P-CCNC: 37 U/L (ref 10–50)
ANION GAP SERPL CALCULATED.3IONS-SCNC: 12 MMOL/L (ref 7–15)
AST SERPL W P-5'-P-CCNC: 27 U/L (ref 10–50)
BILIRUB SERPL-MCNC: 0.9 MG/DL
BUN SERPL-MCNC: 21.2 MG/DL (ref 8–23)
CALCIUM SERPL-MCNC: 9.7 MG/DL (ref 8.8–10.2)
CHLORIDE SERPL-SCNC: 105 MMOL/L (ref 98–107)
CHOLEST SERPL-MCNC: 185 MG/DL
CREAT SERPL-MCNC: 1.36 MG/DL (ref 0.67–1.17)
DEPRECATED HCO3 PLAS-SCNC: 25 MMOL/L (ref 22–29)
ERYTHROCYTE [DISTWIDTH] IN BLOOD BY AUTOMATED COUNT: 13.4 % (ref 10–15)
GFR SERPL CREATININE-BSD FRML MDRD: 53 ML/MIN/1.73M2
GLUCOSE SERPL-MCNC: 92 MG/DL (ref 70–99)
HCT VFR BLD AUTO: 44.5 % (ref 40–53)
HDLC SERPL-MCNC: 28 MG/DL
HGB BLD-MCNC: 14.6 G/DL (ref 13.3–17.7)
LDLC SERPL CALC-MCNC: 90 MG/DL
MCH RBC QN AUTO: 30.9 PG (ref 26.5–33)
MCHC RBC AUTO-ENTMCNC: 32.8 G/DL (ref 31.5–36.5)
MCV RBC AUTO: 94 FL (ref 78–100)
NONHDLC SERPL-MCNC: 157 MG/DL
PLATELET # BLD AUTO: 215 10E3/UL (ref 150–450)
POTASSIUM SERPL-SCNC: 4.3 MMOL/L (ref 3.4–5.3)
PROT SERPL-MCNC: 6.3 G/DL (ref 6.4–8.3)
RBC # BLD AUTO: 4.73 10E6/UL (ref 4.4–5.9)
SODIUM SERPL-SCNC: 142 MMOL/L (ref 136–145)
TRIGL SERPL-MCNC: 337 MG/DL
TSH SERPL DL<=0.005 MIU/L-ACNC: 3.17 UIU/ML (ref 0.3–4.2)
URATE SERPL-MCNC: 7.6 MG/DL (ref 3.4–7)
WBC # BLD AUTO: 7.2 10E3/UL (ref 4–11)

## 2022-08-12 PROCEDURE — 84443 ASSAY THYROID STIM HORMONE: CPT | Performed by: FAMILY MEDICINE

## 2022-08-12 PROCEDURE — 80061 LIPID PANEL: CPT | Performed by: FAMILY MEDICINE

## 2022-08-12 PROCEDURE — 85014 HEMATOCRIT: CPT | Performed by: FAMILY MEDICINE

## 2022-08-12 PROCEDURE — 84550 ASSAY OF BLOOD/URIC ACID: CPT | Performed by: FAMILY MEDICINE

## 2022-08-12 PROCEDURE — 80053 COMPREHEN METABOLIC PANEL: CPT | Performed by: FAMILY MEDICINE

## 2022-10-01 ENCOUNTER — HEALTH MAINTENANCE LETTER (OUTPATIENT)
Age: 77
End: 2022-10-01

## 2023-08-12 ENCOUNTER — HEALTH MAINTENANCE LETTER (OUTPATIENT)
Age: 78
End: 2023-08-12

## 2023-08-14 ENCOUNTER — LAB REQUISITION (OUTPATIENT)
Dept: LAB | Facility: CLINIC | Age: 78
End: 2023-08-14

## 2023-08-14 DIAGNOSIS — D50.9 IRON DEFICIENCY ANEMIA, UNSPECIFIED: ICD-10-CM

## 2023-08-14 DIAGNOSIS — M1A.0790 IDIOPATHIC CHRONIC GOUT, UNSPECIFIED ANKLE AND FOOT, WITHOUT TOPHUS (TOPHI): ICD-10-CM

## 2023-08-14 DIAGNOSIS — E78.2 MIXED HYPERLIPIDEMIA: ICD-10-CM

## 2023-08-14 LAB
ALBUMIN SERPL BCG-MCNC: 4.5 G/DL (ref 3.5–5.2)
ALP SERPL-CCNC: 72 U/L (ref 40–129)
ALT SERPL W P-5'-P-CCNC: 36 U/L (ref 0–70)
ANION GAP SERPL CALCULATED.3IONS-SCNC: 12 MMOL/L (ref 7–15)
AST SERPL W P-5'-P-CCNC: 21 U/L (ref 0–45)
BILIRUB SERPL-MCNC: 0.4 MG/DL
BUN SERPL-MCNC: 24.1 MG/DL (ref 8–23)
CALCIUM SERPL-MCNC: 9.5 MG/DL (ref 8.8–10.2)
CHLORIDE SERPL-SCNC: 107 MMOL/L (ref 98–107)
CHOLEST SERPL-MCNC: 179 MG/DL
CREAT SERPL-MCNC: 1.25 MG/DL (ref 0.67–1.17)
DEPRECATED HCO3 PLAS-SCNC: 24 MMOL/L (ref 22–29)
ERYTHROCYTE [DISTWIDTH] IN BLOOD BY AUTOMATED COUNT: 13.6 % (ref 10–15)
GFR SERPL CREATININE-BSD FRML MDRD: 59 ML/MIN/1.73M2
GLUCOSE SERPL-MCNC: 107 MG/DL (ref 70–99)
HCT VFR BLD AUTO: 45.4 % (ref 40–53)
HDLC SERPL-MCNC: 35 MG/DL
HGB BLD-MCNC: 14.7 G/DL (ref 13.3–17.7)
LDLC SERPL CALC-MCNC: 109 MG/DL
MCH RBC QN AUTO: 30.9 PG (ref 26.5–33)
MCHC RBC AUTO-ENTMCNC: 32.4 G/DL (ref 31.5–36.5)
MCV RBC AUTO: 96 FL (ref 78–100)
NONHDLC SERPL-MCNC: 144 MG/DL
PLATELET # BLD AUTO: 243 10E3/UL (ref 150–450)
POTASSIUM SERPL-SCNC: 4.4 MMOL/L (ref 3.4–5.3)
PROT SERPL-MCNC: 6.5 G/DL (ref 6.4–8.3)
RBC # BLD AUTO: 4.75 10E6/UL (ref 4.4–5.9)
SODIUM SERPL-SCNC: 143 MMOL/L (ref 136–145)
TRIGL SERPL-MCNC: 173 MG/DL
URATE SERPL-MCNC: 6.6 MG/DL (ref 3.4–7)
WBC # BLD AUTO: 5.6 10E3/UL (ref 4–11)

## 2023-08-14 PROCEDURE — 80053 COMPREHEN METABOLIC PANEL: CPT | Performed by: FAMILY MEDICINE

## 2023-08-14 PROCEDURE — 84550 ASSAY OF BLOOD/URIC ACID: CPT | Performed by: FAMILY MEDICINE

## 2023-08-14 PROCEDURE — 85027 COMPLETE CBC AUTOMATED: CPT | Performed by: FAMILY MEDICINE

## 2023-08-14 PROCEDURE — 80061 LIPID PANEL: CPT | Performed by: FAMILY MEDICINE

## 2024-08-15 ENCOUNTER — LAB REQUISITION (OUTPATIENT)
Dept: LAB | Facility: CLINIC | Age: 79
End: 2024-08-15

## 2024-08-15 DIAGNOSIS — D50.9 IRON DEFICIENCY ANEMIA, UNSPECIFIED: ICD-10-CM

## 2024-08-15 DIAGNOSIS — M1A.0790 IDIOPATHIC CHRONIC GOUT, UNSPECIFIED ANKLE AND FOOT, WITHOUT TOPHUS (TOPHI): ICD-10-CM

## 2024-08-15 DIAGNOSIS — Z12.5 ENCOUNTER FOR SCREENING FOR MALIGNANT NEOPLASM OF PROSTATE: ICD-10-CM

## 2024-08-15 DIAGNOSIS — E78.2 MIXED HYPERLIPIDEMIA: ICD-10-CM

## 2024-08-15 LAB
ALBUMIN SERPL BCG-MCNC: 4.2 G/DL (ref 3.5–5.2)
ALP SERPL-CCNC: 71 U/L (ref 40–150)
ALT SERPL W P-5'-P-CCNC: 31 U/L (ref 0–70)
ANION GAP SERPL CALCULATED.3IONS-SCNC: 10 MMOL/L (ref 7–15)
AST SERPL W P-5'-P-CCNC: 22 U/L (ref 0–45)
BILIRUB SERPL-MCNC: 0.6 MG/DL
BUN SERPL-MCNC: 17.7 MG/DL (ref 8–23)
CALCIUM SERPL-MCNC: 9.5 MG/DL (ref 8.8–10.4)
CHLORIDE SERPL-SCNC: 107 MMOL/L (ref 98–107)
CHOLEST SERPL-MCNC: 176 MG/DL
CREAT SERPL-MCNC: 1.24 MG/DL (ref 0.67–1.17)
EGFRCR SERPLBLD CKD-EPI 2021: 59 ML/MIN/1.73M2
ERYTHROCYTE [DISTWIDTH] IN BLOOD BY AUTOMATED COUNT: 13.5 % (ref 10–15)
FASTING STATUS PATIENT QL REPORTED: ABNORMAL
FASTING STATUS PATIENT QL REPORTED: ABNORMAL
GLUCOSE SERPL-MCNC: 114 MG/DL (ref 70–99)
HCO3 SERPL-SCNC: 23 MMOL/L (ref 22–29)
HCT VFR BLD AUTO: 43.8 % (ref 40–53)
HDLC SERPL-MCNC: 32 MG/DL
HGB BLD-MCNC: 14.3 G/DL (ref 13.3–17.7)
LDLC SERPL CALC-MCNC: 108 MG/DL
MCH RBC QN AUTO: 31 PG (ref 26.5–33)
MCHC RBC AUTO-ENTMCNC: 32.6 G/DL (ref 31.5–36.5)
MCV RBC AUTO: 95 FL (ref 78–100)
NONHDLC SERPL-MCNC: 144 MG/DL
PLATELET # BLD AUTO: 215 10E3/UL (ref 150–450)
POTASSIUM SERPL-SCNC: 4.1 MMOL/L (ref 3.4–5.3)
PROT SERPL-MCNC: 6.5 G/DL (ref 6.4–8.3)
PSA SERPL DL<=0.01 NG/ML-MCNC: 7.47 NG/ML
RBC # BLD AUTO: 4.62 10E6/UL (ref 4.4–5.9)
SODIUM SERPL-SCNC: 140 MMOL/L (ref 135–145)
TRIGL SERPL-MCNC: 178 MG/DL
URATE SERPL-MCNC: 7.6 MG/DL (ref 3.4–7)
WBC # BLD AUTO: 5.9 10E3/UL (ref 4–11)

## 2024-08-15 PROCEDURE — 80061 LIPID PANEL: CPT | Performed by: FAMILY MEDICINE

## 2024-08-15 PROCEDURE — 85027 COMPLETE CBC AUTOMATED: CPT | Performed by: FAMILY MEDICINE

## 2024-08-15 PROCEDURE — 84550 ASSAY OF BLOOD/URIC ACID: CPT | Performed by: FAMILY MEDICINE

## 2024-08-15 PROCEDURE — 80053 COMPREHEN METABOLIC PANEL: CPT | Performed by: FAMILY MEDICINE

## 2024-08-15 PROCEDURE — G0103 PSA SCREENING: HCPCS | Performed by: FAMILY MEDICINE

## 2024-10-05 ENCOUNTER — HEALTH MAINTENANCE LETTER (OUTPATIENT)
Age: 79
End: 2024-10-05

## 2024-11-21 ENCOUNTER — HOSPITAL ENCOUNTER (EMERGENCY)
Facility: HOSPITAL | Age: 79
End: 2024-11-21
Attending: EMERGENCY MEDICINE
Payer: COMMERCIAL

## 2024-11-21 DIAGNOSIS — G56.91 NEUROPATHY OF RIGHT HAND: ICD-10-CM

## 2024-11-21 DIAGNOSIS — T88.7XXA MEDICATION SIDE EFFECTS: ICD-10-CM

## 2024-11-21 LAB
ANION GAP SERPL CALCULATED.3IONS-SCNC: 12 MMOL/L (ref 7–15)
BASOPHILS # BLD AUTO: 0 10E3/UL (ref 0–0.2)
BASOPHILS NFR BLD AUTO: 1 %
BUN SERPL-MCNC: 16.8 MG/DL (ref 8–23)
CALCIUM SERPL-MCNC: 9.1 MG/DL (ref 8.8–10.4)
CHLORIDE SERPL-SCNC: 107 MMOL/L (ref 98–107)
CREAT SERPL-MCNC: 1.27 MG/DL (ref 0.67–1.17)
EGFRCR SERPLBLD CKD-EPI 2021: 57 ML/MIN/1.73M2
EOSINOPHIL # BLD AUTO: 0.2 10E3/UL (ref 0–0.7)
EOSINOPHIL NFR BLD AUTO: 4 %
ERYTHROCYTE [DISTWIDTH] IN BLOOD BY AUTOMATED COUNT: 13.8 % (ref 10–15)
GLUCOSE SERPL-MCNC: 116 MG/DL (ref 70–99)
HCO3 SERPL-SCNC: 23 MMOL/L (ref 22–29)
HCT VFR BLD AUTO: 42.2 % (ref 40–53)
HGB BLD-MCNC: 14 G/DL (ref 13.3–17.7)
IMM GRANULOCYTES # BLD: 0 10E3/UL
IMM GRANULOCYTES NFR BLD: 0 %
LYMPHOCYTES # BLD AUTO: 1.3 10E3/UL (ref 0.8–5.3)
LYMPHOCYTES NFR BLD AUTO: 20 %
MCH RBC QN AUTO: 30.6 PG (ref 26.5–33)
MCHC RBC AUTO-ENTMCNC: 33.2 G/DL (ref 31.5–36.5)
MCV RBC AUTO: 92 FL (ref 78–100)
MONOCYTES # BLD AUTO: 0.8 10E3/UL (ref 0–1.3)
MONOCYTES NFR BLD AUTO: 12 %
NEUTROPHILS # BLD AUTO: 4.2 10E3/UL (ref 1.6–8.3)
NEUTROPHILS NFR BLD AUTO: 64 %
NRBC # BLD AUTO: 0 10E3/UL
NRBC BLD AUTO-RTO: 0 /100
PLATELET # BLD AUTO: 205 10E3/UL (ref 150–450)
POTASSIUM SERPL-SCNC: 4.3 MMOL/L (ref 3.4–5.3)
RBC # BLD AUTO: 4.58 10E6/UL (ref 4.4–5.9)
SODIUM SERPL-SCNC: 142 MMOL/L (ref 135–145)
WBC # BLD AUTO: 6.6 10E3/UL (ref 4–11)

## 2024-11-21 PROCEDURE — 250N000013 HC RX MED GY IP 250 OP 250 PS 637: Performed by: EMERGENCY MEDICINE

## 2024-11-21 PROCEDURE — 85025 COMPLETE CBC W/AUTO DIFF WBC: CPT | Performed by: EMERGENCY MEDICINE

## 2024-11-21 PROCEDURE — 99284 EMERGENCY DEPT VISIT MOD MDM: CPT | Mod: 25

## 2024-11-21 PROCEDURE — 36415 COLL VENOUS BLD VENIPUNCTURE: CPT | Performed by: EMERGENCY MEDICINE

## 2024-11-21 PROCEDURE — 80048 BASIC METABOLIC PNL TOTAL CA: CPT | Performed by: EMERGENCY MEDICINE

## 2024-11-21 PROCEDURE — 82310 ASSAY OF CALCIUM: CPT | Performed by: EMERGENCY MEDICINE

## 2024-11-21 PROCEDURE — 82565 ASSAY OF CREATININE: CPT | Performed by: EMERGENCY MEDICINE

## 2024-11-21 RX ORDER — GABAPENTIN 100 MG/1
100 CAPSULE ORAL ONCE
Status: COMPLETED | OUTPATIENT
Start: 2024-11-21 | End: 2024-11-21

## 2024-11-21 RX ADMIN — GABAPENTIN 100 MG: 100 CAPSULE ORAL at 23:28

## 2024-11-21 ASSESSMENT — ACTIVITIES OF DAILY LIVING (ADL): ADLS_ACUITY_SCORE: 0

## 2024-11-21 ASSESSMENT — COLUMBIA-SUICIDE SEVERITY RATING SCALE - C-SSRS
1. IN THE PAST MONTH, HAVE YOU WISHED YOU WERE DEAD OR WISHED YOU COULD GO TO SLEEP AND NOT WAKE UP?: NO
6. HAVE YOU EVER DONE ANYTHING, STARTED TO DO ANYTHING, OR PREPARED TO DO ANYTHING TO END YOUR LIFE?: NO
2. HAVE YOU ACTUALLY HAD ANY THOUGHTS OF KILLING YOURSELF IN THE PAST MONTH?: NO

## 2024-11-22 ENCOUNTER — APPOINTMENT (OUTPATIENT)
Dept: ULTRASOUND IMAGING | Facility: HOSPITAL | Age: 79
End: 2024-11-22
Attending: EMERGENCY MEDICINE
Payer: COMMERCIAL

## 2024-11-22 VITALS
BODY MASS INDEX: 32.93 KG/M2 | SYSTOLIC BLOOD PRESSURE: 131 MMHG | WEIGHT: 230 LBS | TEMPERATURE: 98.4 F | HEIGHT: 70 IN | DIASTOLIC BLOOD PRESSURE: 84 MMHG | RESPIRATION RATE: 18 BRPM | OXYGEN SATURATION: 99 % | HEART RATE: 75 BPM

## 2024-11-22 VITALS
DIASTOLIC BLOOD PRESSURE: 65 MMHG | HEIGHT: 70 IN | BODY MASS INDEX: 32.93 KG/M2 | SYSTOLIC BLOOD PRESSURE: 138 MMHG | WEIGHT: 230 LBS | RESPIRATION RATE: 18 BRPM | OXYGEN SATURATION: 96 % | HEART RATE: 71 BPM | TEMPERATURE: 98.4 F

## 2024-11-22 PROCEDURE — 93931 UPPER EXTREMITY STUDY: CPT | Mod: RT

## 2024-11-22 RX ORDER — CEFDINIR 300 MG/1
300 CAPSULE ORAL 2 TIMES DAILY
Qty: 4 CAPSULE | Refills: 0 | Status: SHIPPED | OUTPATIENT
Start: 2024-11-22 | End: 2024-11-24

## 2024-11-22 RX ORDER — GABAPENTIN 100 MG/1
100 CAPSULE ORAL 2 TIMES DAILY
Qty: 14 CAPSULE | Refills: 0 | Status: SHIPPED | OUTPATIENT
Start: 2024-11-22

## 2024-11-22 RX ORDER — HYDROCODONE BITARTRATE AND ACETAMINOPHEN 5; 325 MG/1; MG/1
1 TABLET ORAL EVERY 6 HOURS PRN
Qty: 10 TABLET | Refills: 0 | Status: SHIPPED | OUTPATIENT
Start: 2024-11-22 | End: 2024-11-25

## 2024-11-22 ASSESSMENT — ACTIVITIES OF DAILY LIVING (ADL): ADLS_ACUITY_SCORE: 0

## 2024-11-22 ASSESSMENT — ENCOUNTER SYMPTOMS: NUMBNESS: 1

## 2024-11-22 NOTE — ED NOTES
Pt reports 8/10 pain that is increasing in his right hand. Redness and mild swelling noted at the right palm. Pt reports it is difficult to move his hand due to the pain. AROM and CMS in URE and ULE intact. He denies universal pruritus, but reports intermittent groin itching. Pt denies n/t. Mild shaking noted. Pt states he took two doses of Cipro, and has never had a reaction to Cipro in the past.

## 2024-11-22 NOTE — ED PROVIDER NOTES
NAME: Justyn Plata  AGE: 79 year old male  YOB: 1945  MRN: [id number]  EVALUATION DATE & TIME: 2024 10:53 PM    PCP: Jhony Felipe    ED PROVIDER: Srikanth Alatorre M.D.      Chief Complaint   Patient presents with    Medication Reaction    Hand swelling         FINAL IMPRESSION:  No diagnosis found.    MEDICAL DECISION MAKIN:20 PM I met with the patient, obtained history, performed an initial exam, and discussed options and plan for diagnostics and treatment here in the ED.   11:23 PM Spoke with Pharmacy regarding drug reactions.     Patient was clinically assessed and consented to treatment. After assessment, medical decision making and workup were discussed with the patient. The patient was agreeable to plan for testing, workup, and treatment.  Pertinent Labs & Imaging studies reviewed. (See chart for details)       Medical Decision Making  Obtained supplemental history:Supplemental history obtained?: Documented in chart  Reviewed external records: External records reviewed?: Documented in chart  Care impacted by chronic illness:{CHRONIC ILLNESS:209302}  Care significantly affected by social determinants of health:{SOCIAL DETERMINANTS OF HEALTH:945518}  Did you consider but not order tests?: In addition to work-up documented, I considered the following work up:   Did you interpret images independently?: Independent interpretation of ECG and images noted in documentation, when applicable.  Consultation discussion with other provider:Did you involve another provider (consultant, , pharmacy, etc.)?: I discussed the care with another health care provider, see documentation for details.  {ADMIT VS D/C:760570}  {[facility] DOCUMENTATION:925317}    Justyn Plata is a 79 year old male who presents with ***.   Differential diagnosis includes but not limited to ***.  ***    *** minutes of critical care time    MEDICATIONS GIVEN IN THE EMERGENCY:  Medications   gabapentin  (NEURONTIN) capsule 100 mg (has no administration in time range)       NEW PRESCRIPTIONS STARTED AT TODAY'S ER VISIT:  New Prescriptions    No medications on file          =================================================================    HPI    Patient information was obtained from: ***    Use of : N/A ***, Yes (MARTII *** Phone ***In Person) - Language ***        Justyn Plata is a 79 year old male with a past medical history of ***, who presents ***      REVIEW OF SYSTEMS   Review of Systems     PAST MEDICAL HISTORY:  Past Medical History:   Diagnosis Date    Anxiety 04/03/2021    Bladder wall thickening     Cataracts, bilateral     Chronic diastolic heart failure (H) 04/03/2021    Closed fracture of multiple ribs 10/2020    DVT of leg (deep venous thrombosis) (H)     Enlarged prostate     GERD (gastroesophageal reflux disease)     Gout     History of pulmonary embolism 12/2019    Hyperlipidemia     Migraines     Nephrolithiasis     Onychomycosis     Peripheral venous insufficiency 04/03/2021       PAST SURGICAL HISTORY:  Past Surgical History:   Procedure Laterality Date    EPIDIDYMECTOMY      HERNIA REPAIR  1990    umbilical    TOE SURGERY Left     great toe-cyst       CURRENT MEDICATIONS:      Current Facility-Administered Medications:     gabapentin (NEURONTIN) capsule 100 mg, 100 mg, Oral, Once, Srikanth Alatorre MD    Current Outpatient Medications:     allopurinoL (ZYLOPRIM) 300 MG tablet, [ALLOPURINOL (ZYLOPRIM) 300 MG TABLET] Take 300 mg by mouth 3 (three) times a week. Takes MWF in the evening, Disp: , Rfl:     aspirin 81 MG EC tablet, [ASPIRIN 81 MG EC TABLET] Take 81 mg by mouth 3 (three) times a week. Takes MWF in the evening, Disp: , Rfl:     cetirizine (ZYRTEC) 10 MG tablet, [CETIRIZINE (ZYRTEC) 10 MG TABLET] Take 10 mg by mouth daily as needed for allergies., Disp: , Rfl:     ibuprofen-diphenhydramine HCl (IBUPROFEN PM) 200-25 mg cap, [IBUPROFEN-DIPHENHYDRAMINE HCL  "(IBUPROFEN PM) 200-25 MG CAP] Take 1 tablet by mouth at bedtime as needed., Disp: , Rfl:     ALLERGIES:  Allergies   Allergen Reactions    Statins-Hmg-Coa Reductase Inhibitors [Statins] Muscle Pain (Myalgia)       FAMILY HISTORY:  Family History   Problem Relation Age of Onset    Colon Cancer Father     Cancer Brother         leiomyosarcoma    Diabetes Type 2  Brother        SOCIAL HISTORY:   Social History     Socioeconomic History    Marital status: Single   Tobacco Use    Smoking status: Former     Current packs/day: 0.00     Average packs/day: 2.0 packs/day for 4.0 years (8.0 ttl pk-yrs)     Types: Cigarettes     Start date: 1960     Quit date: 1964     Years since quittin.9    Smokeless tobacco: Never    Tobacco comments:     was up to 3 PPD at end   Substance and Sexual Activity    Alcohol use: Yes     Comment: Alcoholic Drinks/day: occasional    Drug use: No       PHYSICAL EXAM:    Vitals: BP (!) 169/80   Pulse 66   Temp 98.4  F (36.9  C) (Temporal)   Resp 18   Ht 1.778 m (5' 10\")   Wt 104.3 kg (230 lb)   SpO2 91%   BMI 33.00 kg/m     Physical Exam      LAB:  All pertinent labs reviewed and interpreted.  Labs Ordered and Resulted from Time of ED Arrival to Time of ED Departure - No data to display    RADIOLOGY:  US Upper Extremity Arterial Duplex Right    (Results Pending)       EKG:   Performed at: ***  Impression: ***  Rate: *** bpm  Rhythm: ***  QRS Interval: *** ms  QTc Interval: *** ms  Comparison: ***  I have independently reviewed and interpreted the EKG(s) documented above.     PROCEDURES:   Procedures     Srikanth Alatorre M.D.  Emergency Medicine  [facility] Emergency Department    "    Justyn Plata is a 79 year old male with a past medical history of gout, peripheral venous insufficiency, HLD, chronic diastolic heart failure, DVT, PE, anxiety, who presents hand pain.     Patient reports that yesterday morning he underwent a biopsy of his prostate due to being found to have elevated PSA levels with Urology recently. He endorses taking one Cipro prior to the procedure, and he endorses being discharged at his baseline health after the procedure was performed without any complications, but he notes that then approximately one hour after taking his second dose of Cipro today he developed new sharp right hand pain and swelling. He states that this right hand pain is provoked with closing the hand. He endorses associated numbness of his right 2nd, 3rd, and 4th digits. He denies any recent skin color changes, rashes, or itchiness. 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 He denies any other complications at this time.          REVIEW OF SYSTEMS   Review of Systems   Musculoskeletal:         Positive for right hand pain. Positive for right hand swelling.   Neurological:  Positive for numbness (Right 2nd, 3rd, and 4th digits).   All other systems reviewed and are negative.       PAST MEDICAL HISTORY:  Past Medical History:   Diagnosis Date    Anxiety 04/03/2021    Bladder wall thickening     Cataracts, bilateral     Chronic diastolic heart failure (H) 04/03/2021    Closed fracture of multiple ribs 10/2020    DVT of leg (deep venous thrombosis) (H)     Enlarged prostate     GERD (gastroesophageal reflux disease)     Gout     History of pulmonary embolism 12/2019    Hyperlipidemia     Migraines     Nephrolithiasis     Onychomycosis     Peripheral venous insufficiency 04/03/2021       PAST SURGICAL HISTORY:  Past Surgical History:   Procedure  Laterality Date    EPIDIDYMECTOMY      HERNIA REPAIR      umbilical    TOE SURGERY Left     great toe-cyst       CURRENT MEDICATIONS:    No current facility-administered medications for this encounter.    Current Outpatient Medications:     allopurinoL (ZYLOPRIM) 300 MG tablet, [ALLOPURINOL (ZYLOPRIM) 300 MG TABLET] Take 300 mg by mouth 3 (three) times a week. Takes MWF in the evening, Disp: , Rfl:     aspirin 81 MG EC tablet, [ASPIRIN 81 MG EC TABLET] Take 81 mg by mouth 3 (three) times a week. Takes MWF in the evening, Disp: , Rfl:     cefdinir (OMNICEF) 300 MG capsule, Take 1 capsule (300 mg) by mouth 2 times daily for 2 days., Disp: 4 capsule, Rfl: 0    cetirizine (ZYRTEC) 10 MG tablet, [CETIRIZINE (ZYRTEC) 10 MG TABLET] Take 10 mg by mouth daily as needed for allergies., Disp: , Rfl:     gabapentin (NEURONTIN) 100 MG capsule, Take 1 capsule (100 mg) by mouth 2 times daily., Disp: 14 capsule, Rfl: 0    HYDROcodone-acetaminophen (NORCO) 5-325 MG tablet, Take 1 tablet by mouth every 6 hours as needed for severe pain or breakthrough pain., Disp: 10 tablet, Rfl: 0    ibuprofen-diphenhydramine HCl (IBUPROFEN PM) 200-25 mg cap, [IBUPROFEN-DIPHENHYDRAMINE HCL (IBUPROFEN PM) 200-25 MG CAP] Take 1 tablet by mouth at bedtime as needed., Disp: , Rfl:     ALLERGIES:  Allergies   Allergen Reactions    Statins-Hmg-Coa Reductase Inhibitors [Statins] Muscle Pain (Myalgia)       FAMILY HISTORY:  Family History   Problem Relation Age of Onset    Colon Cancer Father     Cancer Brother         leiomyosarcoma    Diabetes Type 2  Brother        SOCIAL HISTORY:   Social History     Socioeconomic History    Marital status: Single   Tobacco Use    Smoking status: Former     Current packs/day: 0.00     Average packs/day: 2.0 packs/day for 4.0 years (8.0 ttl pk-yrs)     Types: Cigarettes     Start date: 1960     Quit date: 1964     Years since quittin.9    Smokeless tobacco: Never    Tobacco comments:     was up to 3 PPD  "at end   Substance and Sexual Activity    Alcohol use: Yes     Comment: Alcoholic Drinks/day: occasional    Drug use: No       PHYSICAL EXAM:    Vitals: /84   Pulse 75   Temp 98.4  F (36.9  C) (Temporal)   Resp 18   Ht 1.778 m (5' 10\")   Wt 104.3 kg (230 lb)   SpO2 99%   BMI 33.00 kg/m     Physical Exam  Vitals and nursing note reviewed.   Constitutional:       General: He is not in acute distress.     Appearance: Normal appearance. He is normal weight.   HENT:      Head: Normocephalic.   Cardiovascular:      Pulses: Normal pulses.   Pulmonary:      Effort: Pulmonary effort is normal. No respiratory distress.   Musculoskeletal:         General: Swelling and tenderness present. No deformity or signs of injury.        Hands:       Cervical back: Normal range of motion.   Skin:     General: Skin is warm and dry.      Capillary Refill: Capillary refill takes 2 to 3 seconds.      Findings: No bruising, erythema or rash.   Neurological:      Mental Status: He is alert and oriented to person, place, and time.      Cranial Nerves: No cranial nerve deficit.      Sensory: Sensory deficit present.      Coordination: Coordination normal.   Psychiatric:         Behavior: Behavior normal.           LAB:  All pertinent labs reviewed and interpreted.  Labs Ordered and Resulted from Time of ED Arrival to Time of ED Departure   BASIC METABOLIC PANEL - Abnormal       Result Value    Sodium 142      Potassium 4.3      Chloride 107      Carbon Dioxide (CO2) 23      Anion Gap 12      Urea Nitrogen 16.8      Creatinine 1.27 (*)     GFR Estimate 57 (*)     Calcium 9.1      Glucose 116 (*)    CBC WITH PLATELETS AND DIFFERENTIAL    WBC Count 6.6      RBC Count 4.58      Hemoglobin 14.0      Hematocrit 42.2      MCV 92      MCH 30.6      MCHC 33.2      RDW 13.8      Platelet Count 205      % Neutrophils 64      % Lymphocytes 20      % Monocytes 12      % Eosinophils 4      % Basophils 1      % Immature Granulocytes 0      NRBCs " per 100 WBC 0      Absolute Neutrophils 4.2      Absolute Lymphocytes 1.3      Absolute Monocytes 0.8      Absolute Eosinophils 0.2      Absolute Basophils 0.0      Absolute Immature Granulocytes 0.0      Absolute NRBCs 0.0         RADIOLOGY:  US Upper Extremity Arterial Duplex Right   Final Result   IMPRESSION:    1.  Normal arterial Duplex ultrasound of the right arm.            PROCEDURES:   Procedures     Srikanth Alatorre M.D.  Emergency Medicine  [facility] Emergency Department       Srikanth Alatorre MD  11/22/24 [id number]

## 2024-11-22 NOTE — ED NOTES
Pt reports a burning sensation in his right hand that is increasing in pain since walking back from US. CWMS intact. He denies numbness, but reports a tingling sensation in his right hand.

## 2024-11-22 NOTE — ED TRIAGE NOTES
Patient arrives from home. States he recently had a prostate biopsy and was prescribed cipro 500 mg, patient had 2 doses today and reports having a medication reaction. He woke up with severe right hand pain, swelling, redness on right palm that woke him from sleep. States he is unable to grasp fully. Patient also complains of lightheadedness. Patient was shaky during triage which is new for him.     Triage Assessment (Adult)       Row Name 11/21/24 2721          Triage Assessment    Airway WDL WDL        Cardiac WDL    Cardiac WDL X  hypertensive        Cognitive/Neuro/Behavioral WDL    Cognitive/Neuro/Behavioral WDL WDL

## 2025-01-10 ENCOUNTER — DOCUMENTATION ONLY (OUTPATIENT)
Dept: EMERGENCY MEDICINE | Facility: CLINIC | Age: 80
End: 2025-01-10
Payer: COMMERCIAL

## 2025-01-10 DIAGNOSIS — G56.91 MONONEUROPATHY OF RIGHT UPPER EXTREMITY: Primary | ICD-10-CM

## 2025-04-01 ENCOUNTER — LAB REQUISITION (OUTPATIENT)
Dept: LAB | Facility: CLINIC | Age: 80
End: 2025-04-01

## 2025-04-01 DIAGNOSIS — N18.31 CHRONIC KIDNEY DISEASE, STAGE 3A (H): ICD-10-CM

## 2025-04-01 LAB
ANION GAP SERPL CALCULATED.3IONS-SCNC: 12 MMOL/L (ref 7–15)
BUN SERPL-MCNC: 19.3 MG/DL (ref 8–23)
CALCIUM SERPL-MCNC: 9.3 MG/DL (ref 8.8–10.4)
CHLORIDE SERPL-SCNC: 107 MMOL/L (ref 98–107)
CREAT SERPL-MCNC: 1.13 MG/DL (ref 0.67–1.17)
EGFRCR SERPLBLD CKD-EPI 2021: 66 ML/MIN/1.73M2
ERYTHROCYTE [DISTWIDTH] IN BLOOD BY AUTOMATED COUNT: 13.8 % (ref 10–15)
GLUCOSE SERPL-MCNC: 113 MG/DL (ref 70–99)
HCO3 SERPL-SCNC: 22 MMOL/L (ref 22–29)
HCT VFR BLD AUTO: 41 % (ref 40–53)
HGB BLD-MCNC: 13.2 G/DL (ref 13.3–17.7)
MCH RBC QN AUTO: 30.8 PG (ref 26.5–33)
MCHC RBC AUTO-ENTMCNC: 32.2 G/DL (ref 31.5–36.5)
MCV RBC AUTO: 96 FL (ref 78–100)
PLATELET # BLD AUTO: 224 10E3/UL (ref 150–450)
POTASSIUM SERPL-SCNC: 4.1 MMOL/L (ref 3.4–5.3)
RBC # BLD AUTO: 4.29 10E6/UL (ref 4.4–5.9)
SODIUM SERPL-SCNC: 141 MMOL/L (ref 135–145)
WBC # BLD AUTO: 5.9 10E3/UL (ref 4–11)

## 2025-04-01 PROCEDURE — 80048 BASIC METABOLIC PNL TOTAL CA: CPT | Performed by: FAMILY MEDICINE

## 2025-04-01 PROCEDURE — 85027 COMPLETE CBC AUTOMATED: CPT | Performed by: FAMILY MEDICINE

## 2025-08-18 ENCOUNTER — LAB REQUISITION (OUTPATIENT)
Dept: LAB | Facility: CLINIC | Age: 80
End: 2025-08-18

## 2025-08-18 DIAGNOSIS — Z85.46 PERSONAL HISTORY OF MALIGNANT NEOPLASM OF PROSTATE: ICD-10-CM

## 2025-08-18 DIAGNOSIS — E78.2 MIXED HYPERLIPIDEMIA: ICD-10-CM

## 2025-08-18 LAB
ALBUMIN SERPL BCG-MCNC: 3.9 G/DL (ref 3.5–5.2)
ALP SERPL-CCNC: 65 U/L (ref 40–150)
ALT SERPL W P-5'-P-CCNC: ABNORMAL U/L
ANION GAP SERPL CALCULATED.3IONS-SCNC: 12 MMOL/L (ref 7–15)
AST SERPL W P-5'-P-CCNC: 39 U/L (ref 0–45)
BILIRUB SERPL-MCNC: 0.4 MG/DL
BUN SERPL-MCNC: 19.4 MG/DL (ref 8–23)
CALCIUM SERPL-MCNC: 9.5 MG/DL (ref 8.8–10.4)
CHLORIDE SERPL-SCNC: 105 MMOL/L (ref 98–107)
CHOLEST SERPL-MCNC: 164 MG/DL
CREAT SERPL-MCNC: 1.13 MG/DL (ref 0.67–1.17)
EGFRCR SERPLBLD CKD-EPI 2021: 65 ML/MIN/1.73M2
FASTING STATUS PATIENT QL REPORTED: YES
FASTING STATUS PATIENT QL REPORTED: YES
GLUCOSE SERPL-MCNC: 121 MG/DL (ref 70–99)
HCO3 SERPL-SCNC: 19 MMOL/L (ref 22–29)
HDLC SERPL-MCNC: 31 MG/DL
LDLC SERPL CALC-MCNC: 79 MG/DL
NONHDLC SERPL-MCNC: 133 MG/DL
POTASSIUM SERPL-SCNC: 4.7 MMOL/L (ref 3.4–5.3)
PROT SERPL-MCNC: 6.5 G/DL (ref 6.4–8.3)
PSA SERPL DL<=0.01 NG/ML-MCNC: 1.04 NG/ML
SODIUM SERPL-SCNC: 136 MMOL/L (ref 135–145)
TRIGL SERPL-MCNC: 268 MG/DL

## 2025-08-18 PROCEDURE — 82465 ASSAY BLD/SERUM CHOLESTEROL: CPT | Performed by: FAMILY MEDICINE

## 2025-08-18 PROCEDURE — 84153 ASSAY OF PSA TOTAL: CPT | Performed by: FAMILY MEDICINE

## 2025-08-18 PROCEDURE — 84155 ASSAY OF PROTEIN SERUM: CPT | Performed by: FAMILY MEDICINE
